# Patient Record
Sex: MALE | Race: WHITE | NOT HISPANIC OR LATINO | URBAN - METROPOLITAN AREA
[De-identification: names, ages, dates, MRNs, and addresses within clinical notes are randomized per-mention and may not be internally consistent; named-entity substitution may affect disease eponyms.]

---

## 2019-10-29 ENCOUNTER — EMERGENCY (EMERGENCY)
Facility: HOSPITAL | Age: 54
LOS: 1 days | Discharge: HOME | End: 2019-10-29
Attending: EMERGENCY MEDICINE
Payer: COMMERCIAL

## 2019-10-29 VITALS
RESPIRATION RATE: 20 BRPM | HEIGHT: 69 IN | HEART RATE: 63 BPM | WEIGHT: 195.11 LBS | OXYGEN SATURATION: 97 % | SYSTOLIC BLOOD PRESSURE: 135 MMHG | DIASTOLIC BLOOD PRESSURE: 87 MMHG | TEMPERATURE: 98 F

## 2019-10-29 DIAGNOSIS — Z90.49 ACQUIRED ABSENCE OF OTHER SPECIFIED PARTS OF DIGESTIVE TRACT: Chronic | ICD-10-CM

## 2019-10-29 DIAGNOSIS — S82.201B UNSPECIFIED FRACTURE OF SHAFT OF RIGHT TIBIA, INITIAL ENCOUNTER FOR OPEN FRACTURE TYPE I OR II: Chronic | ICD-10-CM

## 2019-10-29 PROCEDURE — 73130 X-RAY EXAM OF HAND: CPT

## 2019-10-29 PROCEDURE — 90471 IMMUNIZATION ADMIN: CPT

## 2019-10-29 PROCEDURE — 12004 RPR S/N/AX/GEN/TRK7.6-12.5CM: CPT

## 2019-10-29 PROCEDURE — 90715 TDAP VACCINE 7 YRS/> IM: CPT

## 2019-10-29 PROCEDURE — 99283 EMERGENCY DEPT VISIT LOW MDM: CPT | Mod: 25

## 2019-10-29 PROCEDURE — 73130 X-RAY EXAM OF HAND: CPT | Mod: 26,RT

## 2019-10-29 RX ORDER — TETANUS TOXOID, REDUCED DIPHTHERIA TOXOID AND ACELLULAR PERTUSSIS VACCINE, ADSORBED 5; 2.5; 8; 8; 2.5 [IU]/.5ML; [IU]/.5ML; UG/.5ML; UG/.5ML; UG/.5ML
0.5 SUSPENSION INTRAMUSCULAR ONCE
Refills: 0 | Status: COMPLETED | OUTPATIENT
Start: 2019-10-29 | End: 2019-10-29

## 2019-10-29 RX ADMIN — TETANUS TOXOID, REDUCED DIPHTHERIA TOXOID AND ACELLULAR PERTUSSIS VACCINE, ADSORBED 0.5 MILLILITER(S): 5; 2.5; 8; 8; 2.5 SUSPENSION INTRAMUSCULAR at 10:35

## 2019-10-29 NOTE — ED PROVIDER NOTE - CLINICAL SUMMARY MEDICAL DECISION MAKING FREE TEXT BOX
PT with stable VS, no acute distress, non toxic appearing, tolerating PO in the ED, PT francesco vasc intact, neg xray pt wounds cleaned and closed, PT with strength intact to all finder at CP, PIP, DIP compared to contralateral side when isolated after repair, pt will be dc home with wound care instructions, follow up to hand, informed of possibility and risk of tendon damage and importance of proper follow up, educated about when to return to the ED if needed. PT verbalizes that he understands all instructions and results.

## 2019-10-29 NOTE — ED PROVIDER NOTE - OBJECTIVE STATEMENT
PT with SPMHX of        presents to the ED with complaint of laceration to the hand and arm. PT staes that he was at work on latter lost his balance started to fall over reached out grabed the track of garage door then fell to a standing poistion laceating his hand. PT states that he has mild amount of non raditing pain in his Rt and that fells like sharp pain that is no improved or made worse by anything. PT went to [facility] sent to ed for closure. .PT didnes wekaness, numbness, tingling, loss of sensation, back pain, neck pain, head tarma, ankle or heal pain. PT with SPMHX of CAD, s/p stent on birlinta and ASA  presents to the ED with complaint of laceration to the hand and arm. PT states that he was at work on latter lost his balance started to fall over reached out grabbed the track of garage door then fell to a standing position lacerating his hand. PT states that he has mild amount of non radiating pain in his Rt and that fells like sharp pain that is no improved or made worse by anything. PT went to [facility] sent to ed for closure. .PT dines weakness, numbness, tingling, loss of sensation, back pain, neck pain, head trama, ankle or heal pain.

## 2019-10-29 NOTE — ED PROVIDER NOTE - SKIN LOCATION #1
8 cm laceration involving skin and muscle on the RT palm that is V shaped no obvious tendon injury visualized.

## 2019-10-29 NOTE — ED PROVIDER NOTE - ATTENDING CONTRIBUTION TO CARE
52yo male with PMH Coronary Artery Disease, myocardial infarction on brilinta presenting with right arm and right hand laceration s/p mechanical fall. patient is a  and fell from roof, grabbed onto side of house and cut right arm and right hand, then proceeded to lower himself from roof, landing on feet (fall of ~5-6 feet), did not hit head or loss of consciousness. last tetanus shot unknown. No numbness/tingling/weakness. PE remarkable for 7cm superficial laceration to right upper arm and 8cm v-shaped laceration to palmar aspect of right hand, no tendon or muscle involvement, sensation intact, motor intact. Will obtain xray r/o fracture/FB, laceration repair, update tetanus and reassess. Tila Shin DO

## 2019-10-29 NOTE — ED PROVIDER NOTE - PMH
Chronic shoulder pain, left    MI (myocardial infarction)    S/P cardiac cath    URI (upper respiratory infection)

## 2019-10-29 NOTE — ED PROCEDURE NOTE - CPROC ED LACER REPAIR DETAIL1
Stent was applied./PT on Brilinta stent required to control bleeding was on approximately  45 min removed after procedure pulses intact, 2+, cap refill <2 sec./The wound was explored to base in bloodless field./Enlargement of wound was performed.

## 2019-10-29 NOTE — ED ADULT TRIAGE NOTE - CHIEF COMPLAINT QUOTE
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 rt hand laceration. No head injury. On blood thinners so urgent care sent to us

## 2019-10-29 NOTE — ED PROVIDER NOTE - MUSCULOSKELETAL, MLM
Spine appears normal, range of motion is not limited, no muscle or joint tenderness, no midline ttp. strength intact, GERARDO, no palpable bony abnormalities. PT with strength intact to all finder at CP, PIP, DIP compared to contralateral side when isolated.

## 2019-10-29 NOTE — ED PROVIDER NOTE - PATIENT PORTAL LINK FT
You can access the FollowMyHealth Patient Portal offered by [facility] by registering at the following website: [email or web address]. By joining [employer]’s FollowMyHealth portal, you will also be able to view your health information using other applications (apps) compatible with our system.

## 2022-12-09 NOTE — ED PROVIDER NOTE - CPE EDP NEURO NORM
normal... Xolair Pregnancy And Lactation Text: This medication is Pregnancy Category B and is considered safe during pregnancy. This medication is excreted in breast milk.

## 2023-01-28 RX ORDER — CHLORHEXIDINE GLUCONATE 213 G/1000ML
1 SOLUTION TOPICAL ONCE
Refills: 0 | Status: DISCONTINUED | OUTPATIENT
Start: 2023-01-30 | End: 2023-02-14

## 2023-01-28 NOTE — H&P PST ADULT - HISTORY OF PRESENT ILLNESS
[facility]                                              INTERVENTIONAL CARDIOLOGY AT [place] [address]                                             Telephone: [phone]. Fax:[phone]                                                       History and Physical     HPI: 56 yo male with chest pain for 2 weeks .       Anginal Class:        Angina (Class): CCS III        Ischemic Symptoms: CHARLES     Heart Failure:        Systolic/Diastolic/Combined:        NYHA Class (within 2 weeks):       PAST MEDICAL HISTORY  No pertinent past medical history  URI (upper respiratory infection)  Chronic shoulder pain, left  MI (myocardial infarction)    S/P cardiac cath        Associated Risk Factors:        Frailty Assessment: (none/mild/mod/severe):       Cerebrovascular Disease: N/A       Chronic Lung Disease: N/A       Peripheral Arterial Disease: N/A       Chronic Kidney Disease (if yes, what is GFR): N/A       Uncontrolled Diabetes (if yes, what is HgbA1C or FBS): N/A       Poorly Controlled Hypertension (if yes, what is SBP): N/A       Morbid Obesity (if yes, what is BMI): N/A       History of Recent Ventricular Arrhythmia: N/A       Inability to Ambulate Safely: N/A       Need for Therapeutic Anticoagulation: N/A       Antiplatelet or Contrast Allergy: N/A      PAST SURGICAL HISTORY  S/P appendectomy    Open fracture of right fibula and tibia        SOCIAL HISTORY:      FAMILY HISTORY:  No pertinent family history in first degree relatives      Family History of Premature Cardiovascular Disease:  Yes [x  ] No [  ]    HOME MEDICATIONS:  aspirin 81 mg oral tablet, chewable: 1 tab(s) orally once a day (03 Jan 2016 12:04)  atorvastatin 80 mg oral tablet: 1 tab(s) orally once a day (at bedtime) (03 Jan 2016 12:04)  lisinopril 5 mg oral tablet: 1 tab(s) orally once a day (03 Jan 2016 12:04)  metoprolol: 12.5 milligram(s) orally 2 times a day (03 Jan 2016 12:04)  ticagrelor 90 mg oral tablet: 1 tab(s) orally 2 times a day (03 Jan 2016 12:04)      CURRENT CARDIAC MEDICATIONS:      Antianginal Therapies:        Beta Blockers:         Calcium Channel Blockers:        Long Acting Nitrates:        Ranexa:     ALLERGIES:   No Known Allergies      REVIEW OF SYMPTOMS:   CONSTITUTIONAL: o fever, no chills, no weight loss, no weight gain, no fatigue   CARDIOVASCULAR: ***  RESPIRATORY: no Shortness of breath, no cough, no wheezing  : No dysuria, no hematuria   GI: No dark color stool, no nausea, no diarrhea, no constipation, no abdominal pain   NEURO: No headache, no slurred speech   ALL OTHER REVIEW OF SYSTEMS ARE NEGATIVE.    VITAL SIGNS:      INTAKE AND OUTPUT:       PHYSICAL EXAM:  Constitutional: Comfortable . No acute distress.   HEENT: Atraumatic and normocephalic , neck is supple . no JVD. No carotid bruit.  CNS: A&Ox3. No focal deficits.   Respiratory: CTAB, unlabored   Cardiovascular: RRR normal s1 s2. No murmur. No rubs or gallop.  Gastrointestinal: Soft, non-tender. +Bowel sounds.   Extremities: 2+ Peripheral Pulses, No clubbing, cyanosis, or edema  Psychiatric: Calm . no agitation.   Skin: Warm and dry, no ulcers on extremities     LABS:                            ECG:   Prior ECG: Yes [  ] No [  ]    CARDIAC TESTING   ECHO: 1/24/2023  EF 55%     STRESS:    Cardiac Interventions:    CATH:     ELECTROPHYSIOLOGY:       Indication:   ZORAIDA Score: only if ACS     Risk Stratification:  ASA:  Mallampati:  Bleeding Risk:  Creatinine:  GFR:    Coronary anatomy:           This is 56 y/o male with h/o AWMI s/p LAD stenting (), HPL, HTN, strong family history of premature CAD (Maternal Uncle MI at 48 y/o) with c/o exertional chest pain a/w SOB x 2 weeks.  Patient s/p medium risk EST.  Considering the patient's strong cardiac history medium risk EST, CCS 3 angina, he present's today for [facility] for further evaluation.    ASA 3  Mallampati 2  BRA 1.0%  creat 0.78      Symptoms:        Angina (Class): 3       Ischemic Symptoms: CP and SOB    Heart Failure: NONE        Assessment of LVEF (Must be within 6 months):       EF: 55%       Assessed by: TTE       Date: 23    Prior Cardiac Interventions (LHC, stents, CABG): LAD stenting (RESOLUTE 3.0x26mm)       PCI's (Date, Stents, Vessels):   < from: Cardiac Cath Lab - Adult (12.31.15 @ 10:45) >  Alfredo Mckeon M.D.  INDICATIONS: Initial STEMI.  PROCEDURE:  --  Left heart catheterization with ventriculography.  --  Left coronary angiography.  --  Right coronary angiography.  --  Coronary Thrombectomy.  --  Hemostasis with Mynx-Intervention.  --  Intervention on mid LAD: drug-eluting stent.  TECHNIQUE: Cardiac catheterization performed emergently. Coronary  intervention performed emergently.  Local anesthetic given. Right femoral artery access. Right femoral vein  access. Left heart catheterization. Ventriculography was performed. Left  coronary artery angiography. The vessel was injected utilizing a catheter.  Right coronary artery angiography. The vessel was injected utilizing a  catheter. RADIATION EXPOSURE: 7.5 min. A drug-eluting stent was performed  on the 100 % lesion in the mid LAD. Following intervention there was a 2 %  residual stenosis. There was no dissection. Vessel setup was performed. A  CHOICE  FLOPPY WIRE wire was used to cross the lesion. Vessel setup  was performed. A BMW 300CM WIRE wire was used to cross the lesion. Vessel  setup was performed. A 6F EBU3.5 LAUNCHER guiding catheter was used to  intubate the vessel. A 3.0 X 26 RESOLUTE RX drug-eluting stent was placed  across the lesion and deployed at a maximum inflation pressure of 14 imtiaz.  Balloon angioplasty was performed, with 1 inflations and a maximum  inflation pressure of 6 imtiaz. Coronary Thrombectomy. Hemostasis with  Mynx-Intervention.  CONTRAST GIVEN: Omnipaque 78 ml. Omnipaque 45 ml.  MEDICATIONS GIVEN: Nitroglycerin, 200 mcg, intracoronary. Bivalirudin  (Angiomax), 13 ml, IV. Bivalirudin (Angiomax), infusion rate of 30, IV. 1%  Lidocaine, 10 ml, subcutaneously. ticagrelor, 180 mg, PO. 0.9NS, 400 ml,  IV.  VENTRICLES: EF estimated was 45 %. EF 45% w/ apical MI.  CORONARY VESSELS: L dominant.  Large LAD w/ mid occlusion.  Large LCx w/ moderate OM2 and LPDA disease.  Nondom nonobstr RCA.  LAD:   --  Mid LAD: There was a 100 % stenosis.  COMPLICATIONS: No complications occurred during the cath lab visit. No  complications occurred during the cath lab visit.  DIAGNOSTIC IMPRESSIONS: AWMI secondary to Mid LAD occlusion treated w/  thrombectomy and ICS x 1.  Mild LV dysfunction.  DIAGNOSTIC RECOMMENDATIONS: Asa/brilinta/metoprolol/ACE/statin.  Admit to ICU.  INTERVENTIONAL IMPRESSIONS: AWMI secondary to Mid LAD occlusion treated w/  thrombectomy and ICS x 1.  Mild LV dysfunction.  INTERVENTIONAL RECOMMENDATIONS: Asa/brilinta/metoprolol/ACE/statin.  Admit to ICU.  Prepared and signed by  Alfredo Mckeon MD  Signed 2015 17:31:59  HEMODYNAMIC TABLES  Pressures:  Baseline  Pressures:  - HR: 56  Pressures:  - Rhythm:  Pressures:  -- Aortic Pressure (S/D/M): --/--/25  Pressures:  -- Left Ventricle (s/edp): 106/19/--  Outputs:  Baseline  Outputs:  -- CALCULATIONS: Age in years: 50.06  Outputs:  -- CALCULATIONS: Body Surface Area: 2.00  Outputs:  -- CALCULATIONS: Height in cm: 175.00  Outputs:  -- CALCULATIONS: Sex: Male  Outputs:  -- CALCULATIONS: Weight in k.90    < end of copied text >         CABG (Date, Grafts): NONE    Noninvasive Testing:   Stress Test: Date: 2023       Protocol: JOSIE       Duration of Exercise: 7.0 min       Symptoms: Dyspnea       EKG Changes: none       DTS: +3       Myocardial Imaging: none       Risk Assessment (Low, Medium, High): medium    Echo (Date, Findings): 2023: EF 55%; regional wall motion abnormalities present; hypokinesis of the apical myocardium; LA mildly dilated; RV systolic function is normal; RA mildly dilated; trace AR; trace MR; trace TVR;     Antianginal Therapies: None         Associated Risk Factors:        Cerebrovascular Disease: N/A       Chronic Lung Disease: N/A       Peripheral Arterial Disease: N/A       Chronic Kidney Disease (if yes, what is GFR): N/A       Uncontrolled Diabetes (if yes, what is HgbA1C or FBS): N/A       Poorly Controlled Hypertension (if yes, what is SBP): N/A       Morbid Obesity (if yes, what is BMI): N/A       History of Recent Ventricular Arrhythmia: N/A       Inability to Ambulate Safely: N/A       Need for Therapeutic Anticoagulation: N/A       Antiplatelet or Contrast Allergy: N/A

## 2023-01-28 NOTE — H&P PST ADULT - NSICDXFAMILYHX_GEN_ALL_CORE_FT
FAMILY HISTORY:  Uncle  Still living? No  Family history of premature CAD, Age at diagnosis: 41-50

## 2023-01-28 NOTE — H&P PST ADULT - ASSESSMENT
Plan/Recommendations:   -plan for   -preferred access: RRA ***  -patient seen and examined  -confirmed appropriate NPO duration  -ECG and Labs reviewed  -Aspirin 81mg po pre-cath***  -NS 250mL IV bolus pre-cath***  -procedure discussed with patient; risks and benefits explained, questions answered  -consent obtained by attending IC 58 y/o male with h/o AWMI s/p LAD stenting (2015), HPL, HTN, strong family history of premature CAD (Maternal Uncle MI at 50 y/o) with c/o exertional chest pain a/w SOB x 2 weeks.  Patient s/p medium risk EST.  Considering the patient's strong cardiac history medium risk EST, CCS 3 angina, he present's today for LHC for further evaluation.    Plan/Recommendations:   -plan for LHC via RRA with Dr. Mckeon  -patient seen and examined  -confirmed appropriate NPO duration  -ECG and Labs reviewed  -Aspirin 81mg po pre-cath  -NS 250mL IV bolus pre-cath  -procedure discussed with patient; risks and benefits explained, questions answered  -consent obtained by attending IC

## 2023-01-28 NOTE — H&P PST ADULT - NSICDXPASTMEDICALHX_GEN_ALL_CORE_FT
PAST MEDICAL HISTORY:  Acute MI, anterior wall     CAD (coronary artery disease)     Chronic shoulder pain, left     URI (upper respiratory infection)

## 2023-01-28 NOTE — H&P PST ADULT - NSICDXPASTSURGICALHX_GEN_ALL_CORE_FT
PAST SURGICAL HISTORY:  History of hernia repair     Open fracture of right fibula and tibia     S/P appendectomy

## 2023-01-30 ENCOUNTER — OUTPATIENT (OUTPATIENT)
Dept: OUTPATIENT SERVICES | Facility: HOSPITAL | Age: 58
LOS: 1 days | Discharge: HOME | End: 2023-01-30
Payer: COMMERCIAL

## 2023-01-30 VITALS
DIASTOLIC BLOOD PRESSURE: 94 MMHG | TEMPERATURE: 98 F | SYSTOLIC BLOOD PRESSURE: 127 MMHG | HEART RATE: 78 BPM | WEIGHT: 200.4 LBS | OXYGEN SATURATION: 94 % | HEIGHT: 69 IN | RESPIRATION RATE: 17 BRPM

## 2023-01-30 VITALS — RESPIRATION RATE: 17 BRPM | SYSTOLIC BLOOD PRESSURE: 127 MMHG | HEART RATE: 73 BPM | DIASTOLIC BLOOD PRESSURE: 86 MMHG

## 2023-01-30 DIAGNOSIS — Z98.890 OTHER SPECIFIED POSTPROCEDURAL STATES: Chronic | ICD-10-CM

## 2023-01-30 DIAGNOSIS — Z90.49 ACQUIRED ABSENCE OF OTHER SPECIFIED PARTS OF DIGESTIVE TRACT: Chronic | ICD-10-CM

## 2023-01-30 DIAGNOSIS — I25.10 ATHEROSCLEROTIC HEART DISEASE OF NATIVE CORONARY ARTERY WITHOUT ANGINA PECTORIS: ICD-10-CM

## 2023-01-30 DIAGNOSIS — S82.201B UNSPECIFIED FRACTURE OF SHAFT OF RIGHT TIBIA, INITIAL ENCOUNTER FOR OPEN FRACTURE TYPE I OR II: Chronic | ICD-10-CM

## 2023-01-30 LAB
ALBUMIN SERPL ELPH-MCNC: 4.6 G/DL — SIGNIFICANT CHANGE UP (ref 3.3–5.2)
ALP SERPL-CCNC: 78 U/L — SIGNIFICANT CHANGE UP (ref 40–120)
ALT FLD-CCNC: 38 U/L — SIGNIFICANT CHANGE UP
ANION GAP SERPL CALC-SCNC: 10 MMOL/L — SIGNIFICANT CHANGE UP (ref 5–17)
AST SERPL-CCNC: 27 U/L — SIGNIFICANT CHANGE UP
BASOPHILS # BLD AUTO: 0.03 K/UL — SIGNIFICANT CHANGE UP (ref 0–0.2)
BASOPHILS NFR BLD AUTO: 0.4 % — SIGNIFICANT CHANGE UP (ref 0–2)
BILIRUB SERPL-MCNC: 0.9 MG/DL — SIGNIFICANT CHANGE UP (ref 0.4–2)
BUN SERPL-MCNC: 17.5 MG/DL — SIGNIFICANT CHANGE UP (ref 8–20)
CALCIUM SERPL-MCNC: 9.1 MG/DL — SIGNIFICANT CHANGE UP (ref 8.4–10.5)
CHLORIDE SERPL-SCNC: 106 MMOL/L — SIGNIFICANT CHANGE UP (ref 96–108)
CO2 SERPL-SCNC: 26 MMOL/L — SIGNIFICANT CHANGE UP (ref 22–29)
CREAT SERPL-MCNC: 0.78 MG/DL — SIGNIFICANT CHANGE UP (ref 0.5–1.3)
EGFR: 104 ML/MIN/1.73M2 — SIGNIFICANT CHANGE UP
EOSINOPHIL # BLD AUTO: 0.1 K/UL — SIGNIFICANT CHANGE UP (ref 0–0.5)
EOSINOPHIL NFR BLD AUTO: 1.2 % — SIGNIFICANT CHANGE UP (ref 0–6)
GLUCOSE SERPL-MCNC: 88 MG/DL — SIGNIFICANT CHANGE UP (ref 70–99)
HCT VFR BLD CALC: 46.8 % — SIGNIFICANT CHANGE UP (ref 39–50)
HGB BLD-MCNC: 15.2 G/DL — SIGNIFICANT CHANGE UP (ref 13–17)
IMM GRANULOCYTES NFR BLD AUTO: 0.4 % — SIGNIFICANT CHANGE UP (ref 0–0.9)
LYMPHOCYTES # BLD AUTO: 1.78 K/UL — SIGNIFICANT CHANGE UP (ref 1–3.3)
LYMPHOCYTES # BLD AUTO: 22.1 % — SIGNIFICANT CHANGE UP (ref 13–44)
MCHC RBC-ENTMCNC: 28.5 PG — SIGNIFICANT CHANGE UP (ref 27–34)
MCHC RBC-ENTMCNC: 32.5 GM/DL — SIGNIFICANT CHANGE UP (ref 32–36)
MCV RBC AUTO: 87.6 FL — SIGNIFICANT CHANGE UP (ref 80–100)
MONOCYTES # BLD AUTO: 0.74 K/UL — SIGNIFICANT CHANGE UP (ref 0–0.9)
MONOCYTES NFR BLD AUTO: 9.2 % — SIGNIFICANT CHANGE UP (ref 2–14)
NEUTROPHILS # BLD AUTO: 5.37 K/UL — SIGNIFICANT CHANGE UP (ref 1.8–7.4)
NEUTROPHILS NFR BLD AUTO: 66.7 % — SIGNIFICANT CHANGE UP (ref 43–77)
PLATELET # BLD AUTO: 262 K/UL — SIGNIFICANT CHANGE UP (ref 150–400)
POTASSIUM SERPL-MCNC: 4 MMOL/L — SIGNIFICANT CHANGE UP (ref 3.5–5.3)
POTASSIUM SERPL-SCNC: 4 MMOL/L — SIGNIFICANT CHANGE UP (ref 3.5–5.3)
PROT SERPL-MCNC: 7.1 G/DL — SIGNIFICANT CHANGE UP (ref 6.6–8.7)
RBC # BLD: 5.34 M/UL — SIGNIFICANT CHANGE UP (ref 4.2–5.8)
RBC # FLD: 12.3 % — SIGNIFICANT CHANGE UP (ref 10.3–14.5)
SODIUM SERPL-SCNC: 141 MMOL/L — SIGNIFICANT CHANGE UP (ref 135–145)
WBC # BLD: 8.05 K/UL — SIGNIFICANT CHANGE UP (ref 3.8–10.5)
WBC # FLD AUTO: 8.05 K/UL — SIGNIFICANT CHANGE UP (ref 3.8–10.5)

## 2023-01-30 PROCEDURE — 93010 ELECTROCARDIOGRAM REPORT: CPT

## 2023-01-30 PROCEDURE — C1887: CPT

## 2023-01-30 PROCEDURE — 80053 COMPREHEN METABOLIC PANEL: CPT

## 2023-01-30 PROCEDURE — C1894: CPT

## 2023-01-30 PROCEDURE — 36415 COLL VENOUS BLD VENIPUNCTURE: CPT

## 2023-01-30 PROCEDURE — 93005 ELECTROCARDIOGRAM TRACING: CPT

## 2023-01-30 PROCEDURE — C1769: CPT

## 2023-01-30 PROCEDURE — 85025 COMPLETE CBC W/AUTO DIFF WBC: CPT

## 2023-01-30 PROCEDURE — 93456 R HRT CORONARY ARTERY ANGIO: CPT

## 2023-01-30 RX ORDER — SODIUM CHLORIDE 9 MG/ML
250 INJECTION INTRAMUSCULAR; INTRAVENOUS; SUBCUTANEOUS
Refills: 0 | Status: COMPLETED | OUTPATIENT
Start: 2023-01-30 | End: 2023-01-30

## 2023-01-30 RX ORDER — ASPIRIN/CALCIUM CARB/MAGNESIUM 324 MG
81 TABLET ORAL ONCE
Refills: 0 | Status: COMPLETED | OUTPATIENT
Start: 2023-01-30 | End: 2023-01-30

## 2023-01-30 RX ORDER — AMLODIPINE BESYLATE 2.5 MG/1
1 TABLET ORAL
Qty: 0 | Refills: 0 | DISCHARGE

## 2023-01-30 RX ORDER — CLOPIDOGREL BISULFATE 75 MG/1
1 TABLET, FILM COATED ORAL
Qty: 0 | Refills: 0 | DISCHARGE

## 2023-01-30 RX ORDER — SODIUM CHLORIDE 9 MG/ML
250 INJECTION INTRAMUSCULAR; INTRAVENOUS; SUBCUTANEOUS
Refills: 0 | Status: DISCONTINUED | OUTPATIENT
Start: 2023-01-30 | End: 2023-02-14

## 2023-01-30 RX ADMIN — SODIUM CHLORIDE 250 MILLILITER(S): 9 INJECTION INTRAMUSCULAR; INTRAVENOUS; SUBCUTANEOUS at 16:45

## 2023-01-30 RX ADMIN — Medication 81 MILLIGRAM(S): at 16:45

## 2023-01-30 NOTE — DISCHARGE NOTE NURSING/CASE MANAGEMENT/SOCIAL WORK - NSDCPEFALRISK_GEN_ALL_CORE
For information on Fall & Injury Prevention, visit: [email or web address] OR  [email or web address] OR  https://www.cdc.gov/steadi/patient.html

## 2023-01-30 NOTE — DISCHARGE NOTE NURSING/CASE MANAGEMENT/SOCIAL WORK - NSDCVIVACCINE_GEN_ALL_CORE_FT
Tdap; 29-Oct-2019 10:35; Ayah Nolasco (RN); [employer]; [id number] (Exp. Date: 12-Oct-2021); IntraMuscular; Deltoid Right.; 0.5 milliLiter(s); VIS (VIS Published: 09-May-2013, VIS Presented: 29-Oct-2019);

## 2023-01-30 NOTE — PROGRESS NOTE ADULT - ASSESSMENT
A/P:  58 y/o male with h/o AWMI s/p LAD stenting (2015), HPL, HTN, strong family history of premature CAD (Maternal Uncle MI at 48 y/o) with c/o exertional chest pain a/w SOB x 2 weeks.  Patient s/p medium risk EST.  Considering the patient's strong cardiac history medium risk EST, CCS 3 angina, he present's today for LHC/RHC for further evaluation.  Now s/p LHC/RHC via RRA and RBV with Dr. Mckeon: patent LAD stent; normal pulmonary pressures; Wedge low (prelim report; official report to follow).  -Bedrest x 1 hour post procedure until 1745 then OOB  -Remove right radial band and right brachial venous sheath one hour post procedure at 1745  -IVF hydration post procedure as per protocol to prevent DOLORES  -Continue current med regimen  -Follow up with Dr. Mckeon in one to two weeks: plan for outpatient pulmonary consult  -Activity instructions discussed with patient verbal understanding  -Discussed with Dr. Mckeon

## 2023-01-30 NOTE — DISCHARGE NOTE PROVIDER - CARE PROVIDER_API CALL
Alfredo Mckeon)  Cardiovascular Disease; Interventional Cardiology  [address] [place] [address]  Phone: [phone]  Fax: [phone]  Established Patient  Follow Up Time: 2 weeks

## 2023-01-30 NOTE — PROGRESS NOTE ADULT - SUBJECTIVE AND OBJECTIVE BOX
Interventional Cardiology NP post procedure note:     -s/p LHC/RHC via RRA and RBV with Dr. Mckeon: patent LAD stent; normal pulmonary pressures; Wedge low (prelim report; official report to follow)    MEDICATIONS  (STANDING):  chlorhexidine 4% Liquid 1 Application(s) Topical Once    Allergies:  No Known Allergies      PAST MEDICAL & SURGICAL HISTORY:  URI (upper respiratory infection)      Chronic shoulder pain, left      CAD (coronary artery disease)      Acute MI, anterior wall      S/P appendectomy      Open fracture of right fibula and tibia      History of hernia repair          Vital Signs Last 24 Hrs  T(C): 36.9 (30 Jan 2023 14:27), Max: 36.9 (30 Jan 2023 14:27)  T(F): 98.4 (30 Jan 2023 14:27), Max: 98.4 (30 Jan 2023 14:27)  HR: 75 (30 Jan 2023 17:25) (65 - 78)  BP: 128/84 (30 Jan 2023 17:25) (127/94 - 140/89)  BP(mean): --  RR: 17 (30 Jan 2023 17:25) (17 - 17)  SpO2: 94% (30 Jan 2023 17:25) (94% - 95%)    Parameters below as of 30 Jan 2023 17:25  Patient On (Oxygen Delivery Method): room air        Physical Exam:  Constitutional: NAD, AAOx3  Cardiovascular: +S1S2 RRR  Pulmonary: CTA b/l, unlabored  GI: soft NTND +BS  Extremities: no pedal edema, +distal pulses b/l  Neuro: non focal, SHAH x4  Procedure sites: Right radial band and right brachial venous sheath in place; sites benign without hematoma/bleeding; RUE warm/mobile/acyanotic; +right ulnar pulse    LABS:                        15.2   8.05  )-----------( 262      ( 30 Jan 2023 14:10 )             46.8     01-30    141  |  106  |  17.5  ----------------------------<  88  4.0   |  26.0  |  0.78    Ca    9.1      30 Jan 2023 14:10    TPro  7.1  /  Alb  4.6  /  TBili  0.9  /  DBili  x   /  AST  27  /  ALT  38  /  AlkPhos  78  01-30          RADIOLOGY & ADDITIONAL TESTS:

## 2023-01-30 NOTE — DISCHARGE NOTE PROVIDER - HOSPITAL COURSE
58 y/o male with h/o AWMI s/p LAD stenting (2015), HPL, HTN, strong family history of premature CAD (Maternal Uncle MI at 50 y/o) with c/o exertional chest pain a/w SOB x 2 weeks.  Patient s/p medium risk EST.  Considering the patient's strong cardiac history medium risk EST, CCS 3 angina, he present's today for LHC/RHC for further evaluation.  Now s/p LHC/RHC via RRA and RBV with Dr. Mckeon: patent LAD stent; normal pulmonary pressures; Wedge low (prelim report; official report to follow).  -Bedrest x 1 hour post procedure until 1745 then OOB  -Remove right radial band and right brachial venous sheath one hour post procedure at 1745  -IVF hydration post procedure as per protocol to prevent DOLORES  -Continue current med regimen  -Follow up with Dr. Mckeon in one to two weeks: plan for outpatient pulmonary consult  -Activity instructions discussed with patient verbal understanding  -Discussed with Dr. Mckeon

## 2023-01-30 NOTE — DISCHARGE NOTE PROVIDER - NSDCMRMEDTOKEN_GEN_ALL_CORE_FT
amLODIPine 2.5 mg oral tablet: 1 tab(s) orally once a day  aspirin 81 mg oral tablet, chewable: 1 tab(s) orally once a day  atorvastatin 80 mg oral tablet: 1 tab(s) orally once a day (at bedtime)  clopidogrel 75 mg oral tablet: 1 tab(s) orally once a day  lisinopril 5 mg oral tablet: 1 tab(s) orally once a day

## 2023-01-30 NOTE — DISCHARGE NOTE PROVIDER - NSDCCPCAREPLAN_GEN_ALL_CORE_FT
PRINCIPAL DISCHARGE DIAGNOSIS  Diagnosis: Shortness of breath  Assessment and Plan of Treatment: Plan for pulmonary consult as outpatient

## 2023-01-30 NOTE — DISCHARGE NOTE PROVIDER - NSDCHC_MEDRECSTATUS_GEN_ALL_CORE
2-Mar-2018  6:20 PM   [facility]   ?3 minutes in AT/AF Since Last Session  Battery/leads/recent auto measurement trends within expected range.     Since last interrogation on 27-Feb-2018:  2 Atrial High Rate Episodes detected.  Most recent 28-Feb-2018  See attached episode list & stored EGM’s for details.    Device appears to be functioning as programmed.    ks     Admission Reconciliation is Completed  Discharge Reconciliation is Completed

## 2023-01-30 NOTE — DISCHARGE NOTE NURSING/CASE MANAGEMENT/SOCIAL WORK - PATIENT PORTAL LINK FT
You can access the FollowMyHealth Patient Portal offered by [facility] by registering at the following website: [email or web address]. By joining [employer]’s FollowMyHealth portal, you will also be able to view your health information using other applications (apps) compatible with our system.

## 2023-01-30 NOTE — DISCHARGE NOTE PROVIDER - NSDCCPTREATMENT_GEN_ALL_CORE_FT
PRINCIPAL PROCEDURE  Procedure: Left and right heart catheterization  Findings and Treatment: Restricted use with no heavy lifting of affected arm for 48 hours.  No submerging the arm in water for 48 hours.  You may start showering today.  Call your doctor for any bleeding, swelling, loss of sensation in the hand or fingers, or fingers turning blue.  If heavy bleeding or large lumps form, hold pressure at the spot and come to the Emergency Room.

## 2023-01-31 DIAGNOSIS — I27.20 PULMONARY HYPERTENSION, UNSPECIFIED: ICD-10-CM

## 2025-06-09 NOTE — ED ADULT TRIAGE NOTE - MODE OF ARRIVAL
Your CT showed no fracture.  You do have a Baker's cyst and a joint effusion.  Contact your orthopedic surgeon tomorrow to report your condition and arrange a follow-up appointment.  Please return for worse symptoms or concerns.   Private Vehicle